# Patient Record
Sex: MALE | Race: WHITE | NOT HISPANIC OR LATINO | Employment: FULL TIME | URBAN - METROPOLITAN AREA
[De-identification: names, ages, dates, MRNs, and addresses within clinical notes are randomized per-mention and may not be internally consistent; named-entity substitution may affect disease eponyms.]

---

## 2020-06-12 ENCOUNTER — OFFICE VISIT (OUTPATIENT)
Dept: FAMILY MEDICINE | Facility: CLINIC | Age: 50
End: 2020-06-12

## 2020-06-12 VITALS
WEIGHT: 163.8 LBS | RESPIRATION RATE: 16 BRPM | TEMPERATURE: 96.6 F | HEART RATE: 77 BPM | OXYGEN SATURATION: 97 % | DIASTOLIC BLOOD PRESSURE: 72 MMHG | BODY MASS INDEX: 23.45 KG/M2 | HEIGHT: 70 IN | SYSTOLIC BLOOD PRESSURE: 116 MMHG

## 2020-06-12 DIAGNOSIS — H66.001 ACUTE SUPPURATIVE OTITIS MEDIA OF RIGHT EAR WITHOUT SPONTANEOUS RUPTURE OF TYMPANIC MEMBRANE, RECURRENCE NOT SPECIFIED: Primary | ICD-10-CM

## 2020-06-12 PROCEDURE — 99203 OFFICE O/P NEW LOW 30 MIN: CPT | Performed by: NURSE PRACTITIONER

## 2020-06-12 RX ORDER — AMOXICILLIN 875 MG
875 TABLET ORAL 2 TIMES DAILY
Qty: 14 TABLET | Refills: 0 | Status: SHIPPED | OUTPATIENT
Start: 2020-06-12 | End: 2020-06-19

## 2020-06-12 ASSESSMENT — MIFFLIN-ST. JEOR: SCORE: 1614.24

## 2020-06-12 NOTE — PATIENT INSTRUCTIONS
Patient Education     Otitis Media (Middle-Ear Infection) in Adults  Otitis media is another name for a middle-ear infection. It means an infection behind your eardrum. This kind of ear infection can happen after any condition that keeps fluid from draining from the middle ear. These conditions include allergies, a cold, a sore throat, or a respiratory infection.  Middle-ear infections are common in children, but they can also happen in adults. An ear infection in an adult may mean a more serious problem than in a child. So you may need additional tests. If you have an ear infection, you should see your health care provider for treatment.  What are the types of middle-ear infections?  Infections can affect the middle ear in several ways. They are:    Acute otitis media. This middle-ear infection occurs suddenly. It causes swelling and redness. Fluid and mucus become trapped inside the ear. You can have a fever and ear pain.    Otitis media with effusion. Fluid (effusion) and mucus build up in the middle ear after the infection goes away. You may feel like your middle ear is full. This can continue for months and may affect your hearing.    Chronic otitis media with effusion. Fluid (effusion) remains in the middle ear for a long time. Or it builds up again and again, even though there is no infection. This type of middle-ear infection may be hard to treat. It may also affect your hearing.  Who is more likely to get a middle-ear infection?  You are more likely to get an ear infection if you:    Smoke or are around someone who smokes    Have seasonal or year-round allergy symptoms    Have a cold or other upper respiratory infection  What causes a middle-ear infection?  The middle ear connects to the throat by a canal called the eustachian tube. This tube helps even out the pressure between the outer ear and the inner ear. A cold or allergy can irritate the tube or cause the area around it to swell. This can keep  fluid from draining from the middle ear. The fluid builds up behind the eardrum. Bacteria and viruses can grow in this fluid. The bacteria and viruses cause the middle-ear infection.  What are the symptoms of a middle-ear infection?  Common symptoms of a middle-ear infection in adults are:    Pain in 1 or both ears    Drainage from the ear    Muffled hearing    Sore throat   You may also have a fever. Rarely, your balance can be affected.  These symptoms may be the same as for other conditions. It s important to talk with your health care provider if you think you have a middle-ear infection. If you have a high fever, severe pain behind your ear, or paralysis in your face, see your provider as soon as you can.  How is a middle-ear infection diagnosed?  Your health care provider will take a medical history and do a physical exam. He or she will look at the outer ear and eardrum with an otoscope. The otoscope is a lighted tool that lets your provider see inside the ear. A pneumatic otoscope blows a puff of air into the ear to check how well your eardrum moves. If you eardrum doesn t move well, it may mean you have fluid behind it.  Your provider may also do a test called tympanometry. This test tells how well the middle ear is working. It can find any changes in pressure in the middle ear. Your provider may test your hearing with a tuning fork.  How is a middle-ear infection treated?  A middle-ear infection may be treated with:    Antibiotics, taken by mouth or as ear drops    Medication for pain    Decongestants, antihistamines, or nasal steroids  Your health care provider may also have you try autoinsufflation. This helps adjust the air pressure in your ear. For this, you pinch your nose and gently exhale. This forces air back through the eustachian tube.  The exact treatment for your ear infection will depend on the type of infection you have. In general, if your symptoms don t get better in 48 to 72 hours, contact  your health care provider.  Middle-ear infections can cause long-term problems if not treated. They can lead to:    Infection in other parts of the head    Permanent hearing loss    Paralysis of a nerve in your face  If you have a middle-ear infection that doesn t get better, you may need to see an ear, nose, and throat specialist (otolaryngologist). You may need a CT scan or MRI to check for head and neck cancer.  Ear tubes  Sometimes fluid stays in the middle ear even after you take antibiotics and the infection goes away. In this case, your health care provider may suggest that a small tube be placed in your ear. The tube is put at the opening of the eardrum. The tube keeps fluid from building up and relieves pressure in the middle ear. It can also help you hear better. This surgery is called myringotomy. It is not often done in adults.  The tubes usually fall out on their own after 6 months to a year.    4128-2782 [employer]. [address] [place] [address], [place] [address]. All rights reserved. This information is not intended as a substitute for professional medical care. Always follow your healthcare professional's instructions.

## 2020-06-12 NOTE — PROGRESS NOTES
"Subjective     Tyrone Ann is a 50 year old male who presents to clinic today for the following health issues:    HPI   ENT Symptoms             Symptoms: cc Present Absent Comment   Fever/Chills   x    Fatigue   x    Muscle Aches   x    Eye Irritation   x    Sneezing   x    Nasal Jere/Drg   x    Sinus Pressure/Pain       Loss of smell   x    Dental pain   x    Sore Throat   x    Swollen Glands   x    Ear Pain/Fullness x x  Right ear pain, swelling, draining fluid   Cough  x  Smoker    Wheeze   x    Chest Pain   x    Shortness of breath   x    Rash   x    Other    Needs doctor note      Symptom duration:  1 week   Symptom severity:  moderate to severe    Treatments tried:  tylenol, OTC ear drops    Contacts:  none        There is no problem list on file for this patient.    History reviewed. No pertinent surgical history.    Social History     Tobacco Use     Smoking status: Current Every Day Smoker     Packs/day: 1.00     Smokeless tobacco: Never Used   Substance Use Topics     Alcohol use: Not Currently     History reviewed. No pertinent family history.          Reviewed and updated as needed this visit by Provider         Review of Systems   Constitutional, HEENT, cardiovascular, pulmonary, gi and gu systems are negative, except as otherwise noted.      Objective    /72 (BP Location: Right arm, Patient Position: Sitting, Cuff Size: Adult Regular)   Pulse 77   Temp 96.6  F (35.9  C) (Tympanic)   Resp 16   Ht 1.77 m (5' 9.69\")   Wt 74.3 kg (163 lb 12.8 oz)   SpO2 97%   BMI 23.72 kg/m    Body mass index is 23.72 kg/m .  Physical Exam   GENERAL: healthy, alert and no distress  EYES: Eyes grossly normal to inspection, PERRL and conjunctivae and sclerae normal  HENT: normal cephalic/atraumatic, right ear: erythematous and mucopurulent effusion, left ear: normal: no effusions, no erythema, normal landmarks, nose and mouth without ulcers or lesions, oropharynx clear, oral mucous membranes moist and " sinuses: not tender. No visible perforations or drainage in canal. Difficult exam due to very narrow canals.  NECK: no adenopathy, no asymmetry, masses, or scars and thyroid normal to palpation  RESP: lungs clear to auscultation - no rales, rhonchi or wheezes  CV: regular rates and rhythm, normal S1 S2, no S3 or S4, no murmur, click or rub and no peripheral edema  MS: no gross musculoskeletal defects noted, no edema  SKIN: no suspicious lesions or rashes  NEURO: Normal strength and tone, mentation intact and speech normal    Diagnostic Test Results:  none         Assessment & Plan       ICD-10-CM    1. Acute suppurative otitis media of right ear without spontaneous rupture of tympanic membrane, recurrence not specified  H66.001 amoxicillin (AMOXIL) 875 MG tablet        Tobacco Cessation:   reports that he has been smoking. He has been smoking about 1.00 pack per day. He has never used smokeless tobacco.  Tobacco Cessation Action Plan: Information offered: Patient not interested at this time        FUTURE APPOINTMENTS:       - Follow up in 3-5 days for symptoms that are not improving, sooner for new or worsening sx.     Patient Instructions     Patient Education     Otitis Media (Middle-Ear Infection) in Adults  Otitis media is another name for a middle-ear infection. It means an infection behind your eardrum. This kind of ear infection can happen after any condition that keeps fluid from draining from the middle ear. These conditions include allergies, a cold, a sore throat, or a respiratory infection.  Middle-ear infections are common in children, but they can also happen in adults. An ear infection in an adult may mean a more serious problem than in a child. So you may need additional tests. If you have an ear infection, you should see your health care provider for treatment.  What are the types of middle-ear infections?  Infections can affect the middle ear in several ways. They are:    Acute otitis media. This  middle-ear infection occurs suddenly. It causes swelling and redness. Fluid and mucus become trapped inside the ear. You can have a fever and ear pain.    Otitis media with effusion. Fluid (effusion) and mucus build up in the middle ear after the infection goes away. You may feel like your middle ear is full. This can continue for months and may affect your hearing.    Chronic otitis media with effusion. Fluid (effusion) remains in the middle ear for a long time. Or it builds up again and again, even though there is no infection. This type of middle-ear infection may be hard to treat. It may also affect your hearing.  Who is more likely to get a middle-ear infection?  You are more likely to get an ear infection if you:    Smoke or are around someone who smokes    Have seasonal or year-round allergy symptoms    Have a cold or other upper respiratory infection  What causes a middle-ear infection?  The middle ear connects to the throat by a canal called the eustachian tube. This tube helps even out the pressure between the outer ear and the inner ear. A cold or allergy can irritate the tube or cause the area around it to swell. This can keep fluid from draining from the middle ear. The fluid builds up behind the eardrum. Bacteria and viruses can grow in this fluid. The bacteria and viruses cause the middle-ear infection.  What are the symptoms of a middle-ear infection?  Common symptoms of a middle-ear infection in adults are:    Pain in 1 or both ears    Drainage from the ear    Muffled hearing    Sore throat   You may also have a fever. Rarely, your balance can be affected.  These symptoms may be the same as for other conditions. It s important to talk with your health care provider if you think you have a middle-ear infection. If you have a high fever, severe pain behind your ear, or paralysis in your face, see your provider as soon as you can.  How is a middle-ear infection diagnosed?  Your health care provider  will take a medical history and do a physical exam. He or she will look at the outer ear and eardrum with an otoscope. The otoscope is a lighted tool that lets your provider see inside the ear. A pneumatic otoscope blows a puff of air into the ear to check how well your eardrum moves. If you eardrum doesn t move well, it may mean you have fluid behind it.  Your provider may also do a test called tympanometry. This test tells how well the middle ear is working. It can find any changes in pressure in the middle ear. Your provider may test your hearing with a tuning fork.  How is a middle-ear infection treated?  A middle-ear infection may be treated with:    Antibiotics, taken by mouth or as ear drops    Medication for pain    Decongestants, antihistamines, or nasal steroids  Your health care provider may also have you try autoinsufflation. This helps adjust the air pressure in your ear. For this, you pinch your nose and gently exhale. This forces air back through the eustachian tube.  The exact treatment for your ear infection will depend on the type of infection you have. In general, if your symptoms don t get better in 48 to 72 hours, contact your health care provider.  Middle-ear infections can cause long-term problems if not treated. They can lead to:    Infection in other parts of the head    Permanent hearing loss    Paralysis of a nerve in your face  If you have a middle-ear infection that doesn t get better, you may need to see an ear, nose, and throat specialist (otolaryngologist). You may need a CT scan or MRI to check for head and neck cancer.  Ear tubes  Sometimes fluid stays in the middle ear even after you take antibiotics and the infection goes away. In this case, your health care provider may suggest that a small tube be placed in your ear. The tube is put at the opening of the eardrum. The tube keeps fluid from building up and relieves pressure in the middle ear. It can also help you hear better. This  surgery is called myringotomy. It is not often done in adults.  The tubes usually fall out on their own after 6 months to a year.    5860-0368 [employer]. [address] [place] [address], [place] [address]. All rights reserved. This information is not intended as a substitute for professional medical care. Always follow your healthcare professional's instructions.               No follow-ups on file.    MITCHELL Darden [facility]

## 2020-06-12 NOTE — LETTER
[facility]  [address] [address]  Phone: [phone]    06/12/20    Tyrone Ann  [address] [facility] [address]      To whom it may concern:     Tyrone was seen in clinic today for an acute illness. Please excuse him from missed work today.     Sincerely,      MITCHELL Darden CNP

## 2020-06-26 ENCOUNTER — OFFICE VISIT (OUTPATIENT)
Dept: FAMILY MEDICINE | Facility: CLINIC | Age: 50
End: 2020-06-26
Payer: OTHER MISCELLANEOUS

## 2020-06-26 VITALS
TEMPERATURE: 96.8 F | HEART RATE: 77 BPM | DIASTOLIC BLOOD PRESSURE: 78 MMHG | HEIGHT: 70 IN | SYSTOLIC BLOOD PRESSURE: 112 MMHG | RESPIRATION RATE: 20 BRPM | BODY MASS INDEX: 23.77 KG/M2 | WEIGHT: 166 LBS | OXYGEN SATURATION: 96 %

## 2020-06-26 DIAGNOSIS — Z23 ENCOUNTER FOR IMMUNIZATION: ICD-10-CM

## 2020-06-26 DIAGNOSIS — T15.01XS: Primary | ICD-10-CM

## 2020-06-26 PROCEDURE — 65220 REMOVE FOREIGN BODY FROM EYE: CPT | Performed by: FAMILY MEDICINE

## 2020-06-26 PROCEDURE — 90471 IMMUNIZATION ADMIN: CPT | Performed by: FAMILY MEDICINE

## 2020-06-26 PROCEDURE — 90715 TDAP VACCINE 7 YRS/> IM: CPT | Performed by: FAMILY MEDICINE

## 2020-06-26 PROCEDURE — 99213 OFFICE O/P EST LOW 20 MIN: CPT | Mod: 25 | Performed by: FAMILY MEDICINE

## 2020-06-26 RX ORDER — GENTAMICIN SULFATE 3 MG/ML
2 SOLUTION/ DROPS OPHTHALMIC 3 TIMES DAILY
Qty: 1 ML | Refills: 0 | Status: SHIPPED | OUTPATIENT
Start: 2020-06-26 | End: 2020-06-29

## 2020-06-26 ASSESSMENT — MIFFLIN-ST. JEOR: SCORE: 1624.14

## 2020-06-26 NOTE — PROGRESS NOTES
"Subjective     Tyrone Ann is a 50 year old male who presents to clinic today for the following health issues:    HPI   Eye(s) Problem  Work Comp related      Duration: Yesterday afternoon. This is work related, at [employer] in [place]. He was well before the injury.     He was sanding paint and something got in the right eye, about 1-2 pm.     Description:  Location: Right eye  Pain: YES- very painful.  Feels like something is in the outer corner of the eye.  Redness: YES  Discharge: YES- This morning when waking up.    Accompanying signs and symptoms: Watery eye.  No changes with his vision.      History (Trauma, foreign body exposure,): He was working with a sanding disc with particle paint.  He was getting down to the last 6 chairs and some wind came through the door.    He did have glasses on but they don't cover around the outer part of the eye.    Precipitating or alleviating factors (contact use): None    Therapies tried and outcome: He had his medical kit at work and has been flushing the eye.    No current outpatient medications    There is no problem list on file for this patient.      Blood pressure 112/78, pulse 77, temperature 96.8  F (36  C), temperature source Tympanic, resp. rate 20, height 1.77 m (5' 9.68\"), weight 75.3 kg (166 lb), SpO2 96 %.    Exam:  GENERAL APPEARANCE: healthy, alert and no distress  EYES: EOMI,  PERRL  EYES: the right is injected in the conjunctiva, and there is a 1 mm foreign body on the right cornea lateral to the central axis.   No other foreign bodies noted. The right upper lid was everted.   PSYCH: mentation appears normal and affect normal/bright      (T15.01XS) Corneal foreign body with residual material, right, sequela  (primary encounter diagnosis)  Comment:   Plan: OPHTHALMOLOGY ADULT REFERRAL        We used topical Proparicaine for local anesthesia and the #25 blade needle was used to remove the foreign body, which appeared metallic. There " was a rust ring and this was partially removed.   We discussed the options for follow up and the referral was made to the eye doctor. Go to [employer] today or call [phone] for the appt. Dr. Gallagher is there today. The Adacel is given today.   Gentamicin eye drops are given and use these for 2-3 days.       Abhishek Leonard MD

## 2020-06-26 NOTE — PATIENT INSTRUCTIONS
Thank you for choosing [facility].  You may be receiving an email and/or telephone survey request from [employer] [facility] [employer] regarding your visit today.  Please take a few minutes to respond to the survey to let us know how we are doing.      If you have questions or concerns, please contact us via [employer] or you can contact your care team at [phone].    Our Clinic hours are:  Monday 6:40 am  to 7:00 pm  Tuesday -Friday 6:40 am to 5:00 pm    The [place] outpatient lab hours are:  Monday - Friday 6:10 am to 4:45 pm  Saturdays 7:00 am to 11:00 am  Appointments are required, call [phone]    If you have clinical questions after hours or would like to schedule an appointment,  call the clinic at [phone].    (T15.01XS) Corneal foreign body with residual material, right, sequela  (primary encounter diagnosis)  Comment:   Plan: OPHTHALMOLOGY ADULT REFERRAL        We used topical Proparicaine for local anesthesia and the #25 blade needle was used to remove the foreign body, which appeared metallic. There was a rust ring and this was partially removed.   We discussed the options for follow up and the referral was made to the eye doctor. Go to [employer] today or call [phone] for the appt. Dr. Gallagher is there today.  The Adacel is given today.   Gentamicin eye drops are given and use these for 2-3 days.

## 2020-06-26 NOTE — PROGRESS NOTES
Prior to immunization administration, verified patients identity using patient s name and date of birth. Please see Immunization Activity for additional information.     Screening Questionnaire for Adult Immunization    Are you sick today?   No   Do you have allergies to medications, food, a vaccine component or latex?   No   Have you ever had a serious reaction after receiving a vaccination?   No   Do you have a long-term health problem with heart, lung, kidney, or metabolic disease (e.g., diabetes), asthma, a blood disorder, no spleen, complement component deficiency, a cochlear implant, or a spinal fluid leak?  Are you on long-term aspirin therapy?   No   Do you have cancer, leukemia, HIV/AIDS, or any other immune system problem?   No   Do you have a parent, brother, or sister with an immune system problem?   No   In the past 3 months, have you taken medications that affect  your immune system, such as prednisone, other steroids, or anticancer drugs; drugs for the treatment of rheumatoid arthritis, Crohn s disease, or psoriasis; or have you had radiation treatments?   No   Have you had a seizure, or a brain or other nervous system problem?   No   During the past year, have you received a transfusion of blood or blood    products, or been given immune (gamma) globulin or antiviral drug?   No   For women: Are you pregnant or is there a chance you could become       pregnant during the next month?   No   Have you received any vaccinations in the past 4 weeks?   No     Immunization questionnaire answers were all negative.        Per orders of Dr. Leonard, injection of Adacel given by Daniella Damon CMA. Patient instructed to remain in clinic for 15 minutes afterwards, and to report any adverse reaction to me immediately.       Screening performed by Daniella Damon CMA on 6/26/2020 at 3:41 PM.

## 2021-03-16 ENCOUNTER — VIRTUAL VISIT (OUTPATIENT)
Dept: URGENT CARE | Facility: CLINIC | Age: 51
End: 2021-03-16

## 2021-03-16 DIAGNOSIS — J34.89 RHINORRHEA: Primary | ICD-10-CM

## 2021-03-16 DIAGNOSIS — Z20.822 EXPOSURE TO COVID-19 VIRUS: ICD-10-CM

## 2021-03-16 PROCEDURE — 99213 OFFICE O/P EST LOW 20 MIN: CPT | Mod: 95

## 2021-03-16 NOTE — PROGRESS NOTES
"  Tyrone Ann is a 50 year old male who is being evaluated via a billable telephone visit.      The patient has been notified of following:     \"This telephone visit will be conducted via a phone call between you and your physician/provider. We have found that certain health care needs can be provided without the need for a physical exam.  This service lets us provide the care you need with a phone conversation.  If a prescription is necessary we can send it directly to your pharmacy.  If lab work is needed we can place an order for that and you can then stop by our lab to have the test done at a later time.\"     Patient has given verbal consent for telephone visit?  Yes    SUBJECTIVE:  Tyrone Ann is an 50 year old male who presents for runny nose.  Last week with nice weather was outside some.  Then started getting some runny nose and a little cough.  His sxs have improved.  No fevers, chills, sweats.  No body aches or headaches.  No n/v/d.  No change in taste or smell.  Normal appetite.  No skin rashes.  No known exposures.  No recent travel.  No meds used for his sxs.  No shortness of breath or problems breathing    PMH:  neg  Social History     Socioeconomic History     Marital status: Single     Spouse name: Not on file     Number of children: Not on file     Years of education: Not on file     Highest education level: Not on file   Occupational History     Not on file   Social Needs     Financial resource strain: Not on file     Food insecurity     Worry: Not on file     Inability: Not on file     Transportation needs     Medical: Not on file     Non-medical: Not on file   Tobacco Use     Smoking status: Current Every Day Smoker     Packs/day: 1.00     Smokeless tobacco: Never Used   Substance and Sexual Activity     Alcohol use: Not Currently     Drug use: Not Currently     Sexual activity: Yes     Partners: Female   Lifestyle     Physical activity     Days per week: Not on file     Minutes per " session: Not on file     Stress: Not on file   Relationships     Social connections     Talks on phone: Not on file     Gets together: Not on file     Attends Yazidi service: Not on file     Active member of club or organization: Not on file     Attends meetings of clubs or organizations: Not on file     Relationship status: Not on file     Intimate partner violence     Fear of current or ex partner: Not on file     Emotionally abused: Not on file     Physically abused: Not on file     Forced sexual activity: Not on file   Other Topics Concern     Not on file   Social History Narrative     Not on file     No family history on file.    ALLERGIES:  Patient has no known allergies.    No current outpatient medications on file.     No current facility-administered medications for this visit.          ROS:  ROS is done and is negative for general/constitutional, eye, ENT, Respiratory, cardiovascular, GI, , Skin, musculoskeletal except as noted elsewhere.  All other review of systems negative except as noted elsewhere.      OBJECTIVE:  There were no vitals taken for this visit.  GENERAL : Alert and oriented.  Did not seem to be in distress.   RESP: No respiratory distress detected during phone conversation         ASSESSMENT/PLAN:    ASSESSMENT / PLAN:  (J34.89) Rhinorrhea  (primary encounter diagnosis)  Comment: suspect mild allergies, but cannot r/o viral, including covid.  Will test for covid.  Use flonase prn.  Plan: Symptomatic COVID-19 Virus (Coronavirus) by PCR        Reviewed testing scheduling process.  Reviewed quarantine.  I reviewed supportive care, otc meds to use if needed including flonase nasal spray, expected course, and signs of concern.  Follow up as needed or if he does not improve within 1 week(s) or if worsens in any way.  Reviewed red flag symptoms and is to go to the ER if experiences any of these.      (Z20.822) Exposure to COVID-19 virus  Comment: no known exposures but as is in community may  have had unknown exposure  Plan: Reviewed testing scheduling process.  Reviewed quarantine        See [facility] for orders, medications, letters, patient instructions    Meg Gao MD  3/16/2021, 3:14 PM      Phone call duration:  9 minutes

## 2021-03-17 DIAGNOSIS — J34.89 RHINORRHEA: ICD-10-CM

## 2021-03-17 DIAGNOSIS — Z20.822 EXPOSURE TO COVID-19 VIRUS: ICD-10-CM

## 2021-03-17 LAB
SARS-COV-2 RNA RESP QL NAA+PROBE: NORMAL
SPECIMEN SOURCE: NORMAL

## 2021-03-17 PROCEDURE — U0005 INFEC AGEN DETEC AMPLI PROBE: HCPCS | Performed by: INTERNAL MEDICINE

## 2021-03-17 PROCEDURE — U0003 INFECTIOUS AGENT DETECTION BY NUCLEIC ACID (DNA OR RNA); SEVERE ACUTE RESPIRATORY SYNDROME CORONAVIRUS 2 (SARS-COV-2) (CORONAVIRUS DISEASE [COVID-19]), AMPLIFIED PROBE TECHNIQUE, MAKING USE OF HIGH THROUGHPUT TECHNOLOGIES AS DESCRIBED BY CMS-2020-01-R: HCPCS | Performed by: INTERNAL MEDICINE

## 2021-03-18 LAB
LABORATORY COMMENT REPORT: NORMAL
SARS-COV-2 RNA RESP QL NAA+PROBE: NEGATIVE
SPECIMEN SOURCE: NORMAL

## 2022-05-03 ENCOUNTER — HOSPITAL ENCOUNTER (EMERGENCY)
Facility: CLINIC | Age: 52
Discharge: HOME | End: 2022-05-03
Attending: PHYSICIAN ASSISTANT | Admitting: PHYSICIAN ASSISTANT
Payer: COMMERCIAL

## 2022-05-03 VITALS
RESPIRATION RATE: 16 BRPM | DIASTOLIC BLOOD PRESSURE: 80 MMHG | BODY MASS INDEX: 22.4 KG/M2 | HEIGHT: 71 IN | HEART RATE: 65 BPM | TEMPERATURE: 97.9 F | WEIGHT: 160 LBS | SYSTOLIC BLOOD PRESSURE: 133 MMHG | OXYGEN SATURATION: 100 %

## 2022-05-03 DIAGNOSIS — H60.92 OTITIS EXTERNA, LEFT: ICD-10-CM

## 2022-05-03 DIAGNOSIS — K08.89 PAIN, DENTAL: ICD-10-CM

## 2022-05-03 PROCEDURE — 99284 EMERGENCY DEPT VISIT MOD MDM: CPT | Performed by: PHYSICIAN ASSISTANT

## 2022-05-03 RX ORDER — CIPROFLOXACIN/HYDROCORTISONE 0.2 %-1 %
3 SUSPENSION, DROPS(FINAL DOSAGE FORM)(ML) OTIC (EAR) 2 TIMES DAILY
Qty: 3 ML | Refills: 0 | Status: SHIPPED | OUTPATIENT
Start: 2022-05-03 | End: 2022-05-10

## 2022-05-03 ASSESSMENT — ENCOUNTER SYMPTOMS
RESPIRATORY NEGATIVE: 1
FEVER: 0
FACIAL SWELLING: 0
CONSTITUTIONAL NEGATIVE: 1

## 2022-05-03 NOTE — ED TRIAGE NOTES
Pt here with L tooth pain and L ear pain. Pt states that he has been following a dentist for infected teeth, had teeth pulled and was told to do more antibiotics before more teeth would be pulled. Pt states that he called the dentist this AM and was told to come here for antibiotics.      Triage Assessment     Row Name 05/03/22 1044       Triage Assessment (Adult)    Airway WDL WDL       Respiratory WDL    Respiratory WDL WDL       Skin Circulation/Temperature WDL    Skin Circulation/Temperature WDL WDL       Cardiac WDL    Cardiac WDL WDL       Peripheral/Neurovascular WDL    Peripheral Neurovascular WDL WDL       Cognitive/Neuro/Behavioral WDL    Cognitive/Neuro/Behavioral WDL WDL

## 2022-05-03 NOTE — ED PROVIDER NOTES
"  History     Chief Complaint   Patient presents with     Dental Problem     HPI  Tyrone Ann is a 52 year old male who presents with complaints of left upper molar pain for the past week.  Patient states he contacted his dentist who recommended he come in to the emergency department in order to get antibiotics before he has any further dental work done.  Patient states he has had progressive left ear pain in addition.  No drainage from the ear.  He has tried to clean his ear with Q-tips with immense pain.  Denies facial swelling, neck pain/stiffness, or difficulties handling secretions.  Denies gingival swelling.  Denies fevers, chills, sore throat, or cough.      Allergies:  No Known Allergies    Problem List:    There are no problems to display for this patient.       Past Medical History:    No past medical history on file.    Past Surgical History:    No past surgical history on file.    Family History:    No family history on file.    Social History:  Marital Status:  Single [1]  Social History     Tobacco Use     Smoking status: Current Every Day Smoker     Packs/day: 1.00     Smokeless tobacco: Never Used   Substance Use Topics     Alcohol use: Not Currently     Drug use: Not Currently        Medications:    amoxicillin-clavulanate (AUGMENTIN) 875-125 MG tablet  ciprofloxacin-hydrocortisone (CIPRO HC) 0.2-1 % otic suspension          Review of Systems   Constitutional: Negative.  Negative for fever.   HENT: Positive for dental problem and ear pain. Negative for ear discharge and facial swelling.    Respiratory: Negative.    Skin: Negative.    All other systems reviewed and are negative.      Physical Exam   BP: 133/80  Pulse: 65  Temp: 97.9  F (36.6  C)  Resp: 16  Height: 180.3 cm (5' 11\")  Weight: 72.6 kg (160 lb)  SpO2: 100 %      Physical Exam  Constitutional:       General: He is not in acute distress.     Appearance: Normal appearance. He is well-developed. He is not ill-appearing, " toxic-appearing or diaphoretic.   HENT:      Head: Normocephalic and atraumatic.      Right Ear: Tympanic membrane, ear canal and external ear normal.      Left Ear: Tympanic membrane, ear canal and external ear normal. Swelling (and erythema) and tenderness (to tragus and with insertion of otoscope) present. No drainage.      Nose: Nose normal. No rhinorrhea.      Mouth/Throat:      Lips: Pink.      Mouth: Mucous membranes are moist.      Dentition: Dental tenderness present. No gingival swelling or dental abscesses.      Pharynx: Oropharynx is clear. Uvula midline. No pharyngeal swelling, oropharyngeal exudate, posterior oropharyngeal erythema or uvula swelling.      Tonsils: No tonsillar exudate or tonsillar abscesses.      Comments: Overall poor dentition.  He also has tenderness to the left upper gingiva and molar region without evidence of dental abscess.  No facial swelling.    Eyes:      Extraocular Movements: Extraocular movements intact.      Conjunctiva/sclera: Conjunctivae normal.      Pupils: Pupils are equal, round, and reactive to light.   Cardiovascular:      Rate and Rhythm: Normal rate and regular rhythm.      Heart sounds: Normal heart sounds.   Pulmonary:      Effort: Pulmonary effort is normal. No respiratory distress.      Breath sounds: Normal breath sounds. No stridor. No wheezing, rhonchi or rales.   Musculoskeletal:         General: Normal range of motion.      Cervical back: Normal range of motion and neck supple. No rigidity.   Lymphadenopathy:      Cervical: No cervical adenopathy.   Skin:     General: Skin is warm and dry.   Neurological:      General: No focal deficit present.      Mental Status: He is alert and oriented to person, place, and time.      Cranial Nerves: No cranial nerve deficit.   Psychiatric:         Behavior: Behavior is cooperative.         ED Course                 Procedures    No results found for this or any previous visit (from the past 24  hour(s)).    Medications - No data to display    Assessments & Plan (with Medical Decision Making)     DDx: pulpitis, dental abscess, trauma, dry socket, gingivitis, Troy's Angina    Pt is a 52 year old male who presents with complaints of left upper molar pain for the past week.  Patient states he contacted his dentist who recommended he come in to the emergency department in order to get antibiotics before he has any further dental work done.  Patient states he has had progressive left ear pain in addition.  No drainage from the ear.  He has tried to clean his ear with Q-tips with immense pain.      Pt is afebrile on arrival.  Exam as above.  Patient has evidence of left otitis externa on exam.  He also has tenderness to the left upper gingiva and molar region without evidence of dental abscess.  No facial swelling.  Return precautions were reviewed.  Handouts were provided.    Patient was sent with Augmentin and Cipro HC otic drops and was instructed to follow-up with a dentist within the next 2 days for continued care and management (he was given a list of local dentists to follow-up with).  He is to return to the ED for persistent and/or worsening symptoms.  Patient expressed understanding of the diagnosis and plan and was discharged home.    I have reviewed the nursing notes.    I have reviewed the findings, diagnosis, plan and need for follow up with the patient.      Discharge Medication List as of 5/3/2022 11:31 AM      START taking these medications    Details   amoxicillin-clavulanate (AUGMENTIN) 875-125 MG tablet Take 1 tablet by mouth 2 times daily for 7 days, Disp-14 tablet, R-0, E-Prescribe      ciprofloxacin-hydrocortisone (CIPRO HC) 0.2-1 % otic suspension Place 3 drops Into the left ear 2 times daily for 7 days, Disp-3 mL, R-0, E-PrescribePlease call if too expensive and we can change to another antibiotic drop             Final diagnoses:   Pain, dental   Otitis externa, left       5/3/2022   M  [facility] EMERGENCY DEPT      Disclaimer:  This note consists of symbols derived from keyboarding, dictation and/or voice recognition software.  As a result, there may be errors in the script that have gone undetected.  Please consider this when interpreting information found in this chart.     Meg Solorio PA-C  05/03/22 1731       Meg Solorio PA-C  05/03/22 1731       Meg Solorio PA-C  05/03/22 1735